# Patient Record
(demographics unavailable — no encounter records)

---

## 2025-02-21 NOTE — ASSESSMENT
[FreeTextEntry1] : 62 y/o male, former smoker (1 PPD x 40 years), w/ a PMHx of cardiomyopathy, centrilobular emphysema, HLD, HTN, pre-diabetes (diet controlled), and anemia. He was referred to our practice by Dr. Carmina Marshall for evaluation of a right lower lobe nodule found on CT chest.  LDCT Chest performed on 3/28/24 revealed a spiculated pulmonary nodule in the RLL measuring 1.0 x 0.9cm. Follow-up PET-CT performed on 4/15/24 showed small indeterminate pulmonary nodules, largest measuring 7mm in the RLL. No FDG uptake. There're calcified and/or densely appearing mediational and hilar LNs with mild activity and subcarinal node.  Patient underwent CT guided biopsy of the RLL on 05/01/2024, pathology negative for malignant cells. Sampling error cannot be excluded b/c the amount of tissue in the biopsy is very scant.  3-month follow up CT chest in 08/2024 showed stable findings with plans to repeat a CT in 6 months.  Patient presents today to review 6-month CT Chest.  CT Chest 2/7/25: Findings: 9mm nodule with central calcification in the right lower lobe on series 5 image 185 is stable 3mm solid nodule in the posterior left upper lobe on series 5 image 163 is stable No new nodules seen Impression: Mild paraseptal emphysema. Stable pulmonary nodules. No new or suspicious nodules identified.  He feels well. His chest CT including images was reviewed and shows stable findings.We will continue to monitor the nodules with a chest CT in 1 year.  Plan: Chest CT 1 year  I, Dr. Catracho Cervantes MD, personally performed the evaluation and management (E/M) services for this established patient who presents today with (a) new problem(s)/exacerbation of (an) existing condition(s).  That E/M includes conducting the clinically appropriate interval history &/or exam, assessing all new/exacerbated conditions, and establishing a new plan of care. I have also independently reviewed the medical records and imaging at the time of this office visit, and discussed the above mentioned images with interpretations with the patient. Today, my LEANA was here to observe &/or participate in the visit & follow plan of care established by me.  Total time of 20 minutes with > 50% spent with the patient discussing radiologic studies and need for follow up.

## 2025-02-21 NOTE — HISTORY OF PRESENT ILLNESS
[FreeTextEntry1] : 62 y/o male, former smoker (1 PPD x 40 years), w/ a PMHx of cardiomyopathy, centrilobular emphysema, HLD, HTN, pre-diabetes (diet controlled), and anemia. He was referred to our practice by Dr. Carmina Marshall for evaluation of a right lower lobe nodule found on CT chest.  LDCT Chest performed on 3/28/24 revealed a spiculated pulmonary nodule in the RLL measuring 1.0 x 0.9cm. Follow-up PET-CT performed on 4/15/24 showed small indeterminate pulmonary nodules, largest measuring 7mm in the RLL. No FDG uptake. There're calcified and/or densely appearing mediational and hilar LNs with mild activity and subcarinal node.  Patient underwent CT guided biopsy of the RLL on 05/01/2024, pathology negative for malignant cells. Sampling error cannot be excluded b/c the amount of tissue in the biopsy is very scant.  3-month follow up CT chest in 08/2024 showed stable findings with plans to repeat a CT in 6 months.   Patient presents today to review 6-month CT Chest.   CT Chest 2/7/25: Findings:  9mm nodule with central calcification in the right lower lobe on series 5 image 185 is stable  3mm solid nodule in the posterior left upper lobe on series 5 image 163 is stable  No new nodules seen  Impression:  Mild paraseptal emphysema. Stable pulmonary nodules. No new or suspicious nodules identified.

## 2025-02-21 NOTE — HISTORY OF PRESENT ILLNESS
[FreeTextEntry1] : 60 y/o male, former smoker (1 PPD x 40 years), w/ a PMHx of cardiomyopathy, centrilobular emphysema, HLD, HTN, pre-diabetes (diet controlled), and anemia. He was referred to our practice by Dr. Carmina Marshall for evaluation of a right lower lobe nodule found on CT chest.  LDCT Chest performed on 3/28/24 revealed a spiculated pulmonary nodule in the RLL measuring 1.0 x 0.9cm. Follow-up PET-CT performed on 4/15/24 showed small indeterminate pulmonary nodules, largest measuring 7mm in the RLL. No FDG uptake. There're calcified and/or densely appearing mediational and hilar LNs with mild activity and subcarinal node.  Patient underwent CT guided biopsy of the RLL on 05/01/2024, pathology negative for malignant cells. Sampling error cannot be excluded b/c the amount of tissue in the biopsy is very scant.  3-month follow up CT chest in 08/2024 showed stable findings with plans to repeat a CT in 6 months.   Patient presents today to review 6-month CT Chest.   CT Chest 2/7/25: Findings:  9mm nodule with central calcification in the right lower lobe on series 5 image 185 is stable  3mm solid nodule in the posterior left upper lobe on series 5 image 163 is stable  No new nodules seen  Impression:  Mild paraseptal emphysema. Stable pulmonary nodules. No new or suspicious nodules identified.

## 2025-02-21 NOTE — DATA REVIEWED
[FreeTextEntry1] : CT chest non-con 08/05/2024: - There is a spiculated pulmonary nodule in the superior right lower lobe measuring 0.9x0.7cm on series 5 image #179. This nodule has central calcification. There is a 3mm solid nodule in the lateral left upper lobe on series 5 image #159. - Nodule in each lung described above without significant change since March 2024. The larger nodule in the superior RLL appears to have central calcification (benign etiology therefore more likely).  CT guided RLL nodule biopsy on 05/01/2024 - Lung, right lower lobe, core biopsy:  - Scant fragments of alveolar lung parenchyma with fibrin elastotic scar, mild chronic inflammation, and scattered hemosiderin laden macrophages  - Focus of extensive lung parenchymal hyalinization with calcification  - Note: The amount of tissue in the biopsy is very scant. Multiple levels of the specimen are examined microscopically. The findings in this biopsy do not account for a distinct lung mass or nodule. Sampling error cannot be excluded. Recommend repeat biopsy.  - LUNG, RIGHT LOWER LOBE, FNA: NEGATIVE FOR MALIGNANT CELLS. The specimen is predominantly alveolar macrophages and occasional ciliated bronchial epithelial cells. Occasional fragments of crushed lung parenchyma are identified. The cellblock is scant and shows similar findings.  PET-CT performed on 4/15/24: Small indeterminate pulmonary nodules, largest measuring 7mm in the RLL. These are all too small to be characterized on the basis of PET-CT. Prior imaging is not available for comparison. 3 month interval follow-up is recommended with CT chest.  LDCT Chest performed on 3/28/24: Hyperinflation of the lungs Mild paraseptal emphysema Spiculated pulmonary nodule in the RLL measuring 1.0 x 0.9cm and a 3mm solid nodule in the [facility] Lung RADS Category 4A: Suspicious.

## 2025-06-02 NOTE — ASSESSMENT
[FreeTextEntry1] : LONA ABEL is a 62 year M with past medical history significant for NICM, EF 46% (cath in 2020 --> mild non-obstructive disease), ex-heavy smoker, prediabetes, HLD. He is here for follow-up. He offers no cardiac complaints at this time. He has good exercise capacity. BP is well controlled.   - I reviewed ECG done today --> no significant change from prior - I reviewed echo report - I reviewed EST report --> equivocal, but demonstrated good exercise capacity and no CP. Low risk patient.  - check cmp, cbc, lipids, proBNP.  - continue GDMT (metoprolol, lisinopril, rosuvastatin, ASA) - Increase ambulation as tolerated to aim for approximately 30 minutes of moderate activity 5 days a week. Heart healthy diet low in sodium, sugars and saturated fats and high in fruits, vegetables and whole grains. - I counseled him on smoking cessation. (He quit 11/2023)    RTO 4 mo

## 2025-06-02 NOTE — HISTORY OF PRESENT ILLNESS
[FreeTextEntry1] : LONA ABEL is a 62 year y.o. M with medical history significant for NICM , EF 45% (), ex-heavy smoker (quit 2024), prediabetes.  He is here for follow-up.   The last time I saw him was Aug 2024.  He is overall in his usual state of health.  He quit smoking 2024.  Last EST he demonstrated good exercise capacity. No Chest pain. Stress ECG is equivocal.  Echo showed no significant change. EF 40-45%. No significant valve disease He has no cardiac complaints at this time.  Denies CP, SOB, palpitations, orthopnea, dizziness, syncope, LH, HUNG, edema. His only complaint is lower back pain from a herniated disc and will be having physical therapy.  Patient denies changes in his exercise tolerance during the past 6 months. He can walk 10 blocks and can climb 3 flights of stairs.  Sleeps with 2 pillows   He denies history of MI, CVA, DM.  Denies family history of premature ASCVD and /or SCD  No hospitalizations in the past year.  MEDS ASA 81 mg Lisinopril 5 mg metoprolol XL 25 mg Spironolactone 25 mg daily (he is not sure that he is taking it) Rosuvastatin 10 mg    DATA LABS (24): Hgb 13.6; Hct 42.8; Plt 217;  (24): Cre 0.85; K 4.8; eGFR 99; LFTs wnl;  (3/14/24): ; ; HDL 50; LDL 76; Cre 0.92; K 4.8; LFTs wnl; eGFR 95; A1c 5.5  (23) A1C 5.9, HCT 44.3, K 5.0, CRE 0.89, AST 15, ALT <5, ALK 59, eGFR 98, , , HDL 49, , NON- (23): ; ; LDL 87; HDL 45; NonHDL 122; K 4.5; Cre 0.8; LFTs wnl; eGFR 102; TSH 1.47;   EC24: NSR at 61 bpm, iLBBB 3/22/24: Sinus bradycardia at 58 bpm, iLBBB ECG (23): NSR at 62 bpm w nl axis, IVCD , septal Qs (suggesting old anterior MI) and no STT abn  ECG (23): NSR at 69 bpm , old anteroseptal MI, IVCD and no STT abn (no significant change when compared with prior ECG (20):  ECG (25) NSR at 58 bpm, w nl axis, IVCD and no STT abn   EST (24): Ex 8 min of Isaiah protocol and achieved 85% of mphr. METS 10.3. Stress ECG is equivocal for ischemia (Transient ST depression only in lead V6, baseline ECG is ILBBB). Good exercise capacity. No chest pain.  ECHO (24): Nl LV size. EF 40-45%. Global hypokinesis. Trace MR.  ECHO (3/2/23): EF 46%. Global hypokinesis. No significant valvular disease  ECHO (20): EF 45%. Trace MR.    Cath 12/10/20:  Nonobstructive. Luminal irreg

## 2025-06-02 NOTE — PHYSICAL EXAM
[Well Developed] : well developed [Well Nourished] : well nourished [No Acute Distress] : no acute distress [Normal Venous Pressure] : normal venous pressure [No Carotid Bruit] : no carotid bruit [Normal S1, S2] : normal S1, S2 [No Murmur] : no murmur [No Rub] : no rub [Clear Lung Fields] : clear lung fields [Good Air Entry] : good air entry [No Respiratory Distress] : no respiratory distress  [Normal Gait] : normal gait [No Edema] : no edema [No Cyanosis] : no cyanosis [No Clubbing] : no clubbing [Moves all extremities] : moves all extremities [No Focal Deficits] : no focal deficits [Normal Speech] : normal speech [Alert and Oriented] : alert and oriented [Normal memory] : normal memory [de-identified] : Appears older than stated age